# Patient Record
Sex: FEMALE | Race: WHITE | NOT HISPANIC OR LATINO | Employment: FULL TIME | URBAN - METROPOLITAN AREA
[De-identification: names, ages, dates, MRNs, and addresses within clinical notes are randomized per-mention and may not be internally consistent; named-entity substitution may affect disease eponyms.]

---

## 2019-12-17 ENCOUNTER — HISTORICAL (OUTPATIENT)
Dept: RADIOLOGY | Facility: HOSPITAL | Age: 44
End: 2019-12-17

## 2021-12-20 ENCOUNTER — HISTORICAL (OUTPATIENT)
Dept: RADIOLOGY | Facility: HOSPITAL | Age: 46
End: 2021-12-20

## 2023-06-01 DIAGNOSIS — D72.829 ELEVATED WBC COUNT: Primary | ICD-10-CM

## 2023-06-15 ENCOUNTER — OFFICE VISIT (OUTPATIENT)
Dept: HEMATOLOGY/ONCOLOGY | Facility: CLINIC | Age: 48
End: 2023-06-15
Payer: COMMERCIAL

## 2023-06-15 VITALS
DIASTOLIC BLOOD PRESSURE: 80 MMHG | RESPIRATION RATE: 18 BRPM | WEIGHT: 108.63 LBS | HEART RATE: 105 BPM | SYSTOLIC BLOOD PRESSURE: 124 MMHG | OXYGEN SATURATION: 99 % | TEMPERATURE: 98 F

## 2023-06-15 DIAGNOSIS — D72.829 ELEVATED WBC COUNT: ICD-10-CM

## 2023-06-15 LAB
ALBUMIN SERPL-MCNC: 4.1 G/DL (ref 3.5–5)
ALBUMIN/GLOB SERPL: 1.5 RATIO (ref 1.1–2)
ALP SERPL-CCNC: 87 UNIT/L (ref 40–150)
ALT SERPL-CCNC: 25 UNIT/L (ref 0–55)
AST SERPL-CCNC: 21 UNIT/L (ref 5–34)
BASOPHILS # BLD AUTO: 0.04 X10(3)/MCL
BASOPHILS NFR BLD AUTO: 0.3 %
BILIRUBIN DIRECT+TOT PNL SERPL-MCNC: 0.4 MG/DL
BUN SERPL-MCNC: 15.3 MG/DL (ref 7–18.7)
CALCIUM SERPL-MCNC: 9.4 MG/DL (ref 8.4–10.2)
CHLORIDE SERPL-SCNC: 104 MMOL/L (ref 98–107)
CO2 SERPL-SCNC: 26 MMOL/L (ref 22–29)
CREAT SERPL-MCNC: 0.71 MG/DL (ref 0.55–1.02)
EOSINOPHIL # BLD AUTO: 0.05 X10(3)/MCL (ref 0–0.9)
EOSINOPHIL NFR BLD AUTO: 0.4 %
ERYTHROCYTE [DISTWIDTH] IN BLOOD BY AUTOMATED COUNT: 11.7 % (ref 11.5–17)
FERRITIN SERPL-MCNC: 69.59 NG/ML (ref 4.63–204)
FOLATE SERPL-MCNC: 13.7 NG/ML (ref 7–31.4)
GFR SERPLBLD CREATININE-BSD FMLA CKD-EPI: >60 MLS/MIN/1.73/M2
GLOBULIN SER-MCNC: 2.7 GM/DL (ref 2.4–3.5)
GLUCOSE SERPL-MCNC: 88 MG/DL (ref 74–100)
HCT VFR BLD AUTO: 46.8 % (ref 37–47)
HGB BLD-MCNC: 15.3 G/DL (ref 12–16)
IMM GRANULOCYTES # BLD AUTO: 0.07 X10(3)/MCL (ref 0–0.04)
IMM GRANULOCYTES NFR BLD AUTO: 0.6 %
IRON SATN MFR SERPL: 36 % (ref 20–50)
IRON SERPL-MCNC: 116 UG/DL (ref 50–170)
LYMPHOCYTES # BLD AUTO: 3.04 X10(3)/MCL (ref 0.6–4.6)
LYMPHOCYTES NFR BLD AUTO: 24.7 %
MCH RBC QN AUTO: 30.6 PG (ref 27–31)
MCHC RBC AUTO-ENTMCNC: 32.7 G/DL (ref 33–36)
MCV RBC AUTO: 93.6 FL (ref 80–94)
MONOCYTES # BLD AUTO: 0.72 X10(3)/MCL (ref 0.1–1.3)
MONOCYTES NFR BLD AUTO: 5.8 %
NEUTROPHILS # BLD AUTO: 8.39 X10(3)/MCL (ref 2.1–9.2)
NEUTROPHILS NFR BLD AUTO: 68.2 %
PLATELET # BLD AUTO: 259 X10(3)/MCL (ref 130–400)
PMV BLD AUTO: 9.4 FL (ref 7.4–10.4)
POTASSIUM SERPL-SCNC: 4.5 MMOL/L (ref 3.5–5.1)
PROT SERPL-MCNC: 6.8 GM/DL (ref 6.4–8.3)
RBC # BLD AUTO: 5 X10(6)/MCL (ref 4.2–5.4)
SODIUM SERPL-SCNC: 139 MMOL/L (ref 136–145)
TIBC SERPL-MCNC: 206 UG/DL (ref 70–310)
TIBC SERPL-MCNC: 322 UG/DL (ref 250–450)
TRANSFERRIN SERPL-MCNC: 278 MG/DL (ref 180–382)
WBC # SPEC AUTO: 12.31 X10(3)/MCL (ref 4.5–11.5)

## 2023-06-15 PROCEDURE — 81206 BCR/ABL1 GENE MAJOR BP: CPT | Performed by: INTERNAL MEDICINE

## 2023-06-15 PROCEDURE — 99203 PR OFFICE/OUTPT VISIT, NEW, LEVL III, 30-44 MIN: ICD-10-PCS | Mod: S$GLB,,, | Performed by: INTERNAL MEDICINE

## 2023-06-15 PROCEDURE — 85025 COMPLETE CBC W/AUTO DIFF WBC: CPT | Performed by: INTERNAL MEDICINE

## 2023-06-15 PROCEDURE — 80053 COMPREHEN METABOLIC PANEL: CPT | Performed by: INTERNAL MEDICINE

## 2023-06-15 PROCEDURE — 86235 NUCLEAR ANTIGEN ANTIBODY: CPT | Performed by: INTERNAL MEDICINE

## 2023-06-15 PROCEDURE — 1159F MED LIST DOCD IN RCRD: CPT | Mod: CPTII,S$GLB,, | Performed by: INTERNAL MEDICINE

## 2023-06-15 PROCEDURE — 1160F RVW MEDS BY RX/DR IN RCRD: CPT | Mod: CPTII,S$GLB,, | Performed by: INTERNAL MEDICINE

## 2023-06-15 PROCEDURE — 83550 IRON BINDING TEST: CPT | Performed by: INTERNAL MEDICINE

## 2023-06-15 PROCEDURE — 99999 PR PBB SHADOW E&M-EST. PATIENT-LVL IV: CPT | Mod: PBBFAC,,, | Performed by: INTERNAL MEDICINE

## 2023-06-15 PROCEDURE — 36415 COLL VENOUS BLD VENIPUNCTURE: CPT | Performed by: INTERNAL MEDICINE

## 2023-06-15 PROCEDURE — 88187 FLOWCYTOMETRY/READ 2-8: CPT | Performed by: INTERNAL MEDICINE

## 2023-06-15 PROCEDURE — 81207 BCR/ABL1 GENE MINOR BP: CPT | Performed by: INTERNAL MEDICINE

## 2023-06-15 PROCEDURE — 3079F DIAST BP 80-89 MM HG: CPT | Mod: CPTII,S$GLB,, | Performed by: INTERNAL MEDICINE

## 2023-06-15 PROCEDURE — 1159F PR MEDICATION LIST DOCUMENTED IN MEDICAL RECORD: ICD-10-PCS | Mod: CPTII,S$GLB,, | Performed by: INTERNAL MEDICINE

## 2023-06-15 PROCEDURE — 99203 OFFICE O/P NEW LOW 30 MIN: CPT | Mod: S$GLB,,, | Performed by: INTERNAL MEDICINE

## 2023-06-15 PROCEDURE — 85060 BLOOD SMEAR INTERPRETATION: CPT | Performed by: INTERNAL MEDICINE

## 2023-06-15 PROCEDURE — 82746 ASSAY OF FOLIC ACID SERUM: CPT | Performed by: INTERNAL MEDICINE

## 2023-06-15 PROCEDURE — 3074F PR MOST RECENT SYSTOLIC BLOOD PRESSURE < 130 MM HG: ICD-10-PCS | Mod: CPTII,S$GLB,, | Performed by: INTERNAL MEDICINE

## 2023-06-15 PROCEDURE — 99999 PR PBB SHADOW E&M-EST. PATIENT-LVL IV: ICD-10-PCS | Mod: PBBFAC,,, | Performed by: INTERNAL MEDICINE

## 2023-06-15 PROCEDURE — 88185 FLOWCYTOMETRY/TC ADD-ON: CPT

## 2023-06-15 PROCEDURE — 3079F PR MOST RECENT DIASTOLIC BLOOD PRESSURE 80-89 MM HG: ICD-10-PCS | Mod: CPTII,S$GLB,, | Performed by: INTERNAL MEDICINE

## 2023-06-15 PROCEDURE — 3074F SYST BP LT 130 MM HG: CPT | Mod: CPTII,S$GLB,, | Performed by: INTERNAL MEDICINE

## 2023-06-15 PROCEDURE — 88184 FLOWCYTOMETRY/ TC 1 MARKER: CPT

## 2023-06-15 PROCEDURE — 82728 ASSAY OF FERRITIN: CPT | Performed by: INTERNAL MEDICINE

## 2023-06-15 PROCEDURE — 1160F PR REVIEW ALL MEDS BY PRESCRIBER/CLIN PHARMACIST DOCUMENTED: ICD-10-PCS | Mod: CPTII,S$GLB,, | Performed by: INTERNAL MEDICINE

## 2023-06-15 PROCEDURE — 86225 DNA ANTIBODY NATIVE: CPT | Performed by: INTERNAL MEDICINE

## 2023-06-15 RX ORDER — DEXTROAMPHETAMINE SACCHARATE, AMPHETAMINE ASPARTATE, DEXTROAMPHETAMINE SULFATE AND AMPHETAMINE SULFATE 7.5; 7.5; 7.5; 7.5 MG/1; MG/1; MG/1; MG/1
TABLET ORAL
COMMUNITY

## 2023-06-15 RX ORDER — SPIRONOLACTONE 100 MG/1
TABLET, FILM COATED ORAL
COMMUNITY

## 2023-06-15 RX ORDER — LEVOTHYROXINE SODIUM 125 UG/1
TABLET ORAL
COMMUNITY

## 2023-06-15 RX ORDER — ROSUVASTATIN CALCIUM 10 MG/1
TABLET, COATED ORAL
COMMUNITY

## 2023-06-15 RX ORDER — LIOTHYRONINE SODIUM 5 UG/1
TABLET ORAL
COMMUNITY

## 2023-06-15 RX ORDER — SOMATROPIN 5 MG
KIT INTRAMUSCULAR; SUBCUTANEOUS
COMMUNITY

## 2023-06-15 RX ORDER — TESTOSTERONE CYPIONATE 1000 MG/10ML
INJECTION, SOLUTION INTRAMUSCULAR
COMMUNITY

## 2023-06-15 NOTE — PROGRESS NOTES
HEMATOLOGY/ONCOLOGY OFFICE CLINIC VISIT    Visit Information:    Initial Evaluation: 6/15/2023    Referring Provider: Jose Rivera  Other providers:  Code status:    Diagnosis:  Leukocytosis      CLINICAL HISTORY:       Patient: Alberto Sanchez is a 47 y.o. female with history of benign neoplasm of the pituitary gland, hypothyroidism, metabolic syndrome X kindly referred for leukocytosis.    She has not been on steroids.  No recent infection or inflammatory syndrome.  She is very anxious but otherwise voices no concerns.  No family history of leukemia, lymphoma or blood disorders.  No fever, chills, sweats.  No chest pain or short of breath.  No neurological symptoms.    Chief Complaint: Follow-up (No concerns today)      Interval History:        History reviewed. No pertinent past medical history.   Past Surgical History:   Procedure Laterality Date    BREAST SURGERY  2013    augmentation    EYE SURGERY  2014 (?)    Lasik surgery    TONSILLECTOMY  1977     Family History   Problem Relation Age of Onset    Hyperlipidemia Father     Cancer Paternal Grandmother         Breast cancer    Cancer Maternal Aunt         Uterine cancer     Social Connections: Not on file       Review of patient's allergies indicates:   Allergen Reactions    Iodine       Current Outpatient Medications on File Prior to Visit   Medication Sig Dispense Refill    canagliflozin (INVOKANA) 300 mg Tab tablet       dextroamphetamine-amphetamine (ADDERALL) 30 mg Tab       levothyroxine (UNITHROID) 125 MCG tablet       liothyronine (CYTOMEL) 5 MCG Tab       rosuvastatin (CRESTOR) 10 MG tablet       somatropin (SAIZEN) 5 mg SolR       spironolactone (ALDACTONE) 100 MG tablet       testosterone cypionate (DEPOTESTOTERONE CYPIONATE) 100 mg/mL injection        No current facility-administered medications on file prior to visit.      Review of Systems   Constitutional:  Negative for activity change, appetite change, chills, fatigue, fever and unexpected  weight change.   HENT:  Negative for mouth dryness, mouth sores, nosebleeds, sore throat and trouble swallowing.    Eyes:  Negative for visual disturbance.   Respiratory:  Negative for cough and shortness of breath.    Cardiovascular:  Negative for chest pain, palpitations and leg swelling.   Gastrointestinal:  Negative for abdominal distention, abdominal pain, blood in stool, change in bowel habit, constipation, diarrhea, nausea, vomiting and change in bowel habit.   Endocrine: Negative.    Genitourinary:  Negative for dysuria, frequency, hematuria and urgency.   Musculoskeletal:  Negative for arthralgias, back pain, myalgias and neck pain.   Integumentary:  Negative for rash.   Neurological:  Negative for dizziness, tremors, syncope, speech difficulty, weakness, light-headedness, numbness, headaches and memory loss.   Hematological:  Negative for adenopathy. Does not bruise/bleed easily.   Psychiatric/Behavioral:  Negative for confusion and suicidal ideas. The patient is nervous/anxious.             Vitals:    06/15/23 1310   BP: 124/80   Pulse: 105   Resp: 18   Temp: 98.2 °F (36.8 °C)   SpO2: 99%   Weight: 49.3 kg (108 lb 9.6 oz)      Wt Readings from Last 6 Encounters:   06/15/23 49.3 kg (108 lb 9.6 oz)     There is no height or weight on file to calculate BMI.  There is no height or weight on file to calculate BSA.  Physical Exam  Vitals and nursing note reviewed.   Constitutional:       General: She is not in acute distress.     Appearance: Normal appearance. She is well-developed.   HENT:      Head: Normocephalic and atraumatic.      Mouth/Throat:      Mouth: Mucous membranes are moist.   Eyes:      General: No scleral icterus.     Extraocular Movements: Extraocular movements intact.      Conjunctiva/sclera: Conjunctivae normal.      Pupils: Pupils are equal, round, and reactive to light.   Neck:      Vascular: No JVD.   Cardiovascular:      Rate and Rhythm: Normal rate and regular rhythm.      Heart sounds:  No murmur heard.  Pulmonary:      Effort: Pulmonary effort is normal.      Breath sounds: Normal breath sounds. No wheezing or rhonchi.   Abdominal:      General: Bowel sounds are normal. There is no distension.      Palpations: Abdomen is soft. There is no mass.      Tenderness: There is no abdominal tenderness.   Musculoskeletal:         General: No swelling or deformity.      Cervical back: Neck supple.   Lymphadenopathy:      Head:      Right side of head: No submandibular adenopathy.      Left side of head: No submandibular adenopathy.      Cervical: No cervical adenopathy.      Upper Body:      Right upper body: No supraclavicular or axillary adenopathy.      Left upper body: No supraclavicular or axillary adenopathy.      Lower Body: No right inguinal adenopathy. No left inguinal adenopathy.   Skin:     General: Skin is warm.      Coloration: Skin is not jaundiced.      Findings: No lesion or rash.      Nails: There is no clubbing.   Neurological:      General: No focal deficit present.      Mental Status: She is alert and oriented to person, place, and time.      Cranial Nerves: Cranial nerves 2-12 are intact.   Psychiatric:         Attention and Perception: Attention normal.         Behavior: Behavior is cooperative.         Judgment: Judgment normal.     ECOG SCORE             Laboratory:  CBC with Differential:  Lab Results   Component Value Date    WBC 12.31 (H) 06/15/2023    RBC 5.00 06/15/2023    HGB 15.3 06/15/2023    HCT 46.8 06/15/2023    MCV 93.6 06/15/2023    MCH 30.6 06/15/2023    MCHC 32.7 (L) 06/15/2023    RDW 11.7 06/15/2023     06/15/2023    MPV 9.4 06/15/2023        CMP:  Sodium Level   Date Value Ref Range Status   06/15/2023 139 136 - 145 mmol/L Final     Potassium Level   Date Value Ref Range Status   06/15/2023 4.5 3.5 - 5.1 mmol/L Final     Carbon Dioxide   Date Value Ref Range Status   06/15/2023 26 22 - 29 mmol/L Final     Blood Urea Nitrogen   Date Value Ref Range Status    06/15/2023 15.3 7.0 - 18.7 mg/dL Final     Creatinine   Date Value Ref Range Status   06/15/2023 0.71 0.55 - 1.02 mg/dL Final     Calcium Level Total   Date Value Ref Range Status   06/15/2023 9.4 8.4 - 10.2 mg/dL Final     Albumin Level   Date Value Ref Range Status   06/15/2023 4.1 3.5 - 5.0 g/dL Final     Bilirubin Total   Date Value Ref Range Status   06/15/2023 0.4 <=1.5 mg/dL Final     Alkaline Phosphatase   Date Value Ref Range Status   06/15/2023 87 40 - 150 unit/L Final     Aspartate Aminotransferase   Date Value Ref Range Status   06/15/2023 21 5 - 34 unit/L Final     Alanine Aminotransferase   Date Value Ref Range Status   06/15/2023 25 0 - 55 unit/L Final           Assessment:       1. Elevated WBC count              Plan:         Discussed with patient that leukocytosis refers to an increase in the total number of WBCs due to any cause. Leukocytosis can be a reaction to various infectious, or inflammatory process, ie, inflammatory bowel disease, rheumatoid arthritis, and vasculitis, lupus, etc. Medications, like Prednisone, and, in certain instances, physiologic processes (eg, stress, exercise). Some of the blood disorders associated with leukocytosis  includes leukemias, lymphomas, Polycythemia Vera, Sickle Cell Anemia, etc. Discussed with her that I will do a basic workup first and if her leukocytosis is persistent and her blood work is normal then she may need a Bone marrow aspiration and biopsy may be necessary to differentiate leukemia from a benign condition (leukemoid reaction). Discussed with her that if  I find a possible cause, ie, iron deficiency or hypothyroidism, then these will be corrected first and if persistent leukocytosis then she will have the bone marrow biopsy.   Discussed bone marrow biopsy, risks versus benefits and possible complications.    I will do basic blood work today. (CBC and CMP today as above)  Flow cytometry and BCR-ABL today  Will defer bone marrow biopsy for now  but may need in the future if leukocytosis persists/worsen  RTC 1-2 weeks via telemedicine to discuss results  The patient was seen, interviewed and examined. Pertinent lab and radiology studies were reviewed.   The patient was given ample opportunity to ask questions, and to the best of my abilities, all questions answered to satisfaction; patient demonstrated understanding of what we discussed and agreeable to the plan. Pt instructed to call should develop concerning signs/symptoms or have further questions.     I'd like to thank for referring and allowing me the opportunity to participate in the care of this patient and if any questions, please do not hesitate to call the office at [phone].         Lexis Barrientos MD  Hematology/Oncology

## 2023-06-16 LAB — HEMATOLOGIST REVIEW: NORMAL

## 2023-06-17 LAB
ANTINUCLEAR ANTIBODY SCREEN (OHS): NEGATIVE
CENTROMERE QUANT (OHS): <0.4 U/ML
DSDNA AB QUANT (OHS): <0.6 IU/ML
JO-1 AB QUANT (OHS): <0.3 U/ML
RNP70 AB QUANT (OHS): 1 U/ML
SCL-70S AB QUANT (OHS): <0.6 U/ML
SMITH AB QUANT (OHS): <0.8 U/ML
SSA(RO) AB QUANT (OHS): <0.4 U/ML
SSB(LA) AB QUANT (OHS): <0.4 U/ML
U1RNP AB QUANT (OHS): 0.5 U/ML

## 2023-06-19 LAB — MAYO GENERIC ORDERABLE RESULT: NORMAL

## 2023-06-20 LAB
GENETICIST REVIEW: NORMAL
M BCR/ABL1 REFLEX RESULT: NORMAL
SPECIMEN SOURCE: NORMAL

## 2023-06-30 ENCOUNTER — OFFICE VISIT (OUTPATIENT)
Dept: HEMATOLOGY/ONCOLOGY | Facility: CLINIC | Age: 48
End: 2023-06-30
Payer: COMMERCIAL

## 2023-06-30 VITALS
BODY MASS INDEX: 17.81 KG/M2 | TEMPERATURE: 98 F | HEIGHT: 65 IN | SYSTOLIC BLOOD PRESSURE: 127 MMHG | HEART RATE: 88 BPM | OXYGEN SATURATION: 99 % | WEIGHT: 106.88 LBS | DIASTOLIC BLOOD PRESSURE: 79 MMHG

## 2023-06-30 DIAGNOSIS — D72.829 LEUKOCYTOSIS, UNSPECIFIED TYPE: Primary | ICD-10-CM

## 2023-06-30 LAB
BASOPHILS # BLD AUTO: 0.02 X10(3)/MCL
BASOPHILS NFR BLD AUTO: 0.2 %
EOSINOPHIL # BLD AUTO: 0.05 X10(3)/MCL (ref 0–0.9)
EOSINOPHIL NFR BLD AUTO: 0.5 %
ERYTHROCYTE [DISTWIDTH] IN BLOOD BY AUTOMATED COUNT: 11.5 % (ref 11.5–17)
HCT VFR BLD AUTO: 46.6 % (ref 37–47)
HGB BLD-MCNC: 15.1 G/DL (ref 12–16)
IMM GRANULOCYTES # BLD AUTO: 0.03 X10(3)/MCL (ref 0–0.04)
IMM GRANULOCYTES NFR BLD AUTO: 0.3 %
LYMPHOCYTES # BLD AUTO: 2.85 X10(3)/MCL (ref 0.6–4.6)
LYMPHOCYTES NFR BLD AUTO: 29.7 %
MCH RBC QN AUTO: 30.3 PG (ref 27–31)
MCHC RBC AUTO-ENTMCNC: 32.4 G/DL (ref 33–36)
MCV RBC AUTO: 93.4 FL (ref 80–94)
MONOCYTES # BLD AUTO: 0.68 X10(3)/MCL (ref 0.1–1.3)
MONOCYTES NFR BLD AUTO: 7.1 %
NEUTROPHILS # BLD AUTO: 5.98 X10(3)/MCL (ref 2.1–9.2)
NEUTROPHILS NFR BLD AUTO: 62.2 %
PLATELET # BLD AUTO: 253 X10(3)/MCL (ref 130–400)
PMV BLD AUTO: 9.1 FL (ref 7.4–10.4)
RBC # BLD AUTO: 4.99 X10(6)/MCL (ref 4.2–5.4)
WBC # SPEC AUTO: 9.61 X10(3)/MCL (ref 4.5–11.5)

## 2023-06-30 PROCEDURE — 1159F PR MEDICATION LIST DOCUMENTED IN MEDICAL RECORD: ICD-10-PCS | Mod: CPTII,S$GLB,, | Performed by: INTERNAL MEDICINE

## 2023-06-30 PROCEDURE — 3074F PR MOST RECENT SYSTOLIC BLOOD PRESSURE < 130 MM HG: ICD-10-PCS | Mod: CPTII,S$GLB,, | Performed by: INTERNAL MEDICINE

## 2023-06-30 PROCEDURE — 99999 PR PBB SHADOW E&M-EST. PATIENT-LVL IV: CPT | Mod: PBBFAC,,, | Performed by: INTERNAL MEDICINE

## 2023-06-30 PROCEDURE — 1159F MED LIST DOCD IN RCRD: CPT | Mod: CPTII,S$GLB,, | Performed by: INTERNAL MEDICINE

## 2023-06-30 PROCEDURE — 3008F PR BODY MASS INDEX (BMI) DOCUMENTED: ICD-10-PCS | Mod: CPTII,S$GLB,, | Performed by: INTERNAL MEDICINE

## 2023-06-30 PROCEDURE — 36415 COLL VENOUS BLD VENIPUNCTURE: CPT | Performed by: INTERNAL MEDICINE

## 2023-06-30 PROCEDURE — 3008F BODY MASS INDEX DOCD: CPT | Mod: CPTII,S$GLB,, | Performed by: INTERNAL MEDICINE

## 2023-06-30 PROCEDURE — 85025 COMPLETE CBC W/AUTO DIFF WBC: CPT | Performed by: INTERNAL MEDICINE

## 2023-06-30 PROCEDURE — 3078F PR MOST RECENT DIASTOLIC BLOOD PRESSURE < 80 MM HG: ICD-10-PCS | Mod: CPTII,S$GLB,, | Performed by: INTERNAL MEDICINE

## 2023-06-30 PROCEDURE — 3074F SYST BP LT 130 MM HG: CPT | Mod: CPTII,S$GLB,, | Performed by: INTERNAL MEDICINE

## 2023-06-30 PROCEDURE — 99999 PR PBB SHADOW E&M-EST. PATIENT-LVL IV: ICD-10-PCS | Mod: PBBFAC,,, | Performed by: INTERNAL MEDICINE

## 2023-06-30 PROCEDURE — 99213 OFFICE O/P EST LOW 20 MIN: CPT | Mod: S$GLB,,, | Performed by: INTERNAL MEDICINE

## 2023-06-30 PROCEDURE — 3078F DIAST BP <80 MM HG: CPT | Mod: CPTII,S$GLB,, | Performed by: INTERNAL MEDICINE

## 2023-06-30 PROCEDURE — 99213 PR OFFICE/OUTPT VISIT, EST, LEVL III, 20-29 MIN: ICD-10-PCS | Mod: S$GLB,,, | Performed by: INTERNAL MEDICINE

## 2023-06-30 NOTE — PROGRESS NOTES
HEMATOLOGY/ONCOLOGY OFFICE CLINIC VISIT    Visit Information:    Initial Evaluation: 6/15/2023    Referring Provider: Jose Rivera  Other providers:  Code status:    Diagnosis:  Leukocytosis      CLINICAL HISTORY:       Patient: Alberto Sanchez is a 47 y.o. female with history of benign neoplasm of the pituitary gland, hypothyroidism, metabolic syndrome X kindly referred for leukocytosis.    She has not been on steroids.  No recent infection or inflammatory syndrome.  She is very anxious but otherwise voices no concerns.  No family history of leukemia, lymphoma or blood disorders.  No fever, chills, sweats.  No chest pain or short of breath.  No neurological symptoms.    Chief Complaint: OTHER (No concerns today)      Interval History:    Patient presents today for follow up to discuss lab results. She is doing well and has no complaints today.   Will repeat CBC today.         History reviewed. No pertinent past medical history.   Past Surgical History:   Procedure Laterality Date    BREAST SURGERY  2013    augmentation    EYE SURGERY  2014 (?)    Lasik surgery    TONSILLECTOMY  1977     Family History   Problem Relation Age of Onset    Hyperlipidemia Father     Cancer Paternal Grandmother         Breast cancer    Cancer Maternal Aunt         Uterine cancer     Social Connections: Not on file       Review of patient's allergies indicates:   Allergen Reactions    Iodine       Current Outpatient Medications on File Prior to Visit   Medication Sig Dispense Refill    canagliflozin (INVOKANA) 300 mg Tab tablet       dextroamphetamine-amphetamine 30 mg Tab       levothyroxine (SYNTHROID) 125 MCG tablet       liothyronine (CYTOMEL) 5 MCG Tab       rosuvastatin (CRESTOR) 10 MG tablet       somatropin (SAIZEN) 5 mg SolR       spironolactone (ALDACTONE) 100 MG tablet       testosterone cypionate (DEPOTESTOTERONE CYPIONATE) 100 mg/mL injection        No current facility-administered medications on file prior to visit.     "  Review of Systems   Constitutional:  Negative for activity change, appetite change, chills, fatigue, fever and unexpected weight change.   HENT:  Negative for mouth dryness, mouth sores, nosebleeds, sore throat and trouble swallowing.    Eyes:  Negative for visual disturbance.   Respiratory:  Negative for cough and shortness of breath.    Cardiovascular:  Negative for chest pain, palpitations and leg swelling.   Gastrointestinal:  Negative for abdominal distention, abdominal pain, blood in stool, change in bowel habit, constipation, diarrhea, nausea, vomiting and change in bowel habit.   Endocrine: Negative.    Genitourinary:  Negative for dysuria, frequency, hematuria and urgency.   Musculoskeletal:  Negative for arthralgias, back pain, myalgias and neck pain.   Integumentary:  Negative for rash.   Neurological:  Negative for dizziness, tremors, syncope, speech difficulty, weakness, light-headedness, numbness, headaches and memory loss.   Hematological:  Negative for adenopathy. Does not bruise/bleed easily.   Psychiatric/Behavioral:  Negative for confusion and suicidal ideas. The patient is nervous/anxious.             Vitals:    06/30/23 1126   BP: 127/79   BP Location: Right arm   Patient Position: Sitting   Pulse: 88   Temp: 98 °F (36.7 °C)   TempSrc: Oral   SpO2: 99%   Weight: 48.5 kg (106 lb 14.4 oz)   Height: 5' 5" (1.651 m)        Wt Readings from Last 6 Encounters:   06/30/23 48.5 kg (106 lb 14.4 oz)   06/15/23 49.3 kg (108 lb 9.6 oz)     Body mass index is 17.79 kg/m².  Body surface area is 1.49 meters squared.  Physical Exam  Vitals and nursing note reviewed.   Constitutional:       General: She is not in acute distress.     Appearance: Normal appearance. She is well-developed.   HENT:      Head: Normocephalic and atraumatic.      Mouth/Throat:      Mouth: Mucous membranes are moist.   Eyes:      General: No scleral icterus.     Extraocular Movements: Extraocular movements intact.      " Conjunctiva/sclera: Conjunctivae normal.      Pupils: Pupils are equal, round, and reactive to light.   Neck:      Vascular: No JVD.   Cardiovascular:      Rate and Rhythm: Normal rate and regular rhythm.      Heart sounds: No murmur heard.  Pulmonary:      Effort: Pulmonary effort is normal.      Breath sounds: Normal breath sounds. No wheezing or rhonchi.   Abdominal:      General: Bowel sounds are normal. There is no distension.      Palpations: Abdomen is soft. There is no mass.      Tenderness: There is no abdominal tenderness.   Musculoskeletal:         General: No swelling or deformity.      Cervical back: Neck supple.   Lymphadenopathy:      Head:      Right side of head: No submandibular adenopathy.      Left side of head: No submandibular adenopathy.      Cervical: No cervical adenopathy.      Upper Body:      Right upper body: No supraclavicular or axillary adenopathy.      Left upper body: No supraclavicular or axillary adenopathy.      Lower Body: No right inguinal adenopathy. No left inguinal adenopathy.   Skin:     General: Skin is warm.      Coloration: Skin is not jaundiced.      Findings: No lesion or rash.      Nails: There is no clubbing.   Neurological:      General: No focal deficit present.      Mental Status: She is alert and oriented to person, place, and time.      Cranial Nerves: Cranial nerves 2-12 are intact.   Psychiatric:         Attention and Perception: Attention normal.         Behavior: Behavior is cooperative.         Judgment: Judgment normal.     ECOG SCORE             Laboratory:  CBC with Differential:  Lab Results   Component Value Date    WBC 9.61 06/30/2023    RBC 4.99 06/30/2023    HGB 15.1 06/30/2023    HCT 46.6 06/30/2023    MCV 93.4 06/30/2023    MCH 30.3 06/30/2023    MCHC 32.4 (L) 06/30/2023    RDW 11.5 06/30/2023     06/30/2023    MPV 9.1 06/30/2023        CMP:  Sodium Level   Date Value Ref Range Status   06/15/2023 139 136 - 145 mmol/L Final     Potassium  Level   Date Value Ref Range Status   06/15/2023 4.5 3.5 - 5.1 mmol/L Final     Carbon Dioxide   Date Value Ref Range Status   06/15/2023 26 22 - 29 mmol/L Final     Blood Urea Nitrogen   Date Value Ref Range Status   06/15/2023 15.3 7.0 - 18.7 mg/dL Final     Creatinine   Date Value Ref Range Status   06/15/2023 0.71 0.55 - 1.02 mg/dL Final     Calcium Level Total   Date Value Ref Range Status   06/15/2023 9.4 8.4 - 10.2 mg/dL Final     Albumin Level   Date Value Ref Range Status   06/15/2023 4.1 3.5 - 5.0 g/dL Final     Bilirubin Total   Date Value Ref Range Status   06/15/2023 0.4 <=1.5 mg/dL Final     Alkaline Phosphatase   Date Value Ref Range Status   06/15/2023 87 40 - 150 unit/L Final     Aspartate Aminotransferase   Date Value Ref Range Status   06/15/2023 21 5 - 34 unit/L Final     Alanine Aminotransferase   Date Value Ref Range Status   06/15/2023 25 0 - 55 unit/L Final         Assessment:       1. Leukocytosis, unspecified type            Plan:       Discussed with patient that leukocytosis refers to an increase in the total number of WBCs due to any cause. Leukocytosis can be a reaction to various infectious, or inflammatory process, ie, inflammatory bowel disease, rheumatoid arthritis, and vasculitis, lupus, etc. Medications, like Prednisone, and, in certain instances, physiologic processes (eg, stress, exercise). Some of the blood disorders associated with leukocytosis  includes leukemias, lymphomas, Polycythemia Vera, Sickle Cell Anemia, etc. Discussed with her that I will do a basic workup first and if her leukocytosis is persistent and her blood work is normal then she may need a Bone marrow aspiration and biopsy may be necessary to differentiate leukemia from a benign condition (leukemoid reaction). Discussed with her that if  I find a possible cause, ie, iron deficiency or hypothyroidism, then these will be corrected first and if persistent leukocytosis then she will have the bone marrow biopsy.    Discussed bone marrow biopsy, risks versus benefits and possible complications.    Will repeat CBC today - WBC normal  Will defer bone marrow biopsy for now but may need in the future if leukocytosis persists/worsen  RTC PRN  Will fax copy of office note to Dr. Jose Rivera    The patient was seen, interviewed and examined. Pertinent lab and radiology studies were reviewed.   The patient was given ample opportunity to ask questions, and to the best of my abilities, all questions answered to satisfaction; patient demonstrated understanding of what we discussed and agreeable to the plan. Pt instructed to call should develop concerning signs/symptoms or have further questions.     Lexis Barrientos MD  Hematology/Oncology      Professional Services   I, Irene Warren LPN, acted solely as a scribe for and in the presence of Dr. Lexis Barrientos, who performed these services.